# Patient Record
Sex: MALE | NOT HISPANIC OR LATINO | Employment: FULL TIME | URBAN - METROPOLITAN AREA
[De-identification: names, ages, dates, MRNs, and addresses within clinical notes are randomized per-mention and may not be internally consistent; named-entity substitution may affect disease eponyms.]

---

## 2022-01-02 ENCOUNTER — APPOINTMENT (OUTPATIENT)
Dept: CT IMAGING | Facility: CLINIC | Age: 27
End: 2022-01-02
Attending: EMERGENCY MEDICINE
Payer: OTHER MISCELLANEOUS

## 2022-01-02 ENCOUNTER — HOSPITAL ENCOUNTER (EMERGENCY)
Facility: CLINIC | Age: 27
Discharge: HOME | End: 2022-01-02
Attending: EMERGENCY MEDICINE | Admitting: EMERGENCY MEDICINE
Payer: OTHER MISCELLANEOUS

## 2022-01-02 VITALS
BODY MASS INDEX: 24.62 KG/M2 | HEART RATE: 59 BPM | TEMPERATURE: 98.7 F | OXYGEN SATURATION: 98 % | DIASTOLIC BLOOD PRESSURE: 80 MMHG | RESPIRATION RATE: 18 BRPM | SYSTOLIC BLOOD PRESSURE: 127 MMHG | HEIGHT: 70 IN | WEIGHT: 172 LBS

## 2022-01-02 DIAGNOSIS — R10.9 ABDOMINAL WALL PAIN: ICD-10-CM

## 2022-01-02 LAB
CREAT BLD-MCNC: 1.1 MG/DL (ref 0.7–1.3)
ERYTHROCYTE [DISTWIDTH] IN BLOOD BY AUTOMATED COUNT: 11.6 % (ref 10–15)
GFR SERPL CREATININE-BSD FRML MDRD: >60 ML/MIN/1.73M2
HCT VFR BLD AUTO: 40.6 % (ref 40–53)
HGB BLD-MCNC: 14.2 G/DL (ref 13.3–17.7)
MCH RBC QN AUTO: 31.1 PG (ref 26.5–33)
MCHC RBC AUTO-ENTMCNC: 35 G/DL (ref 31.5–36.5)
MCV RBC AUTO: 89 FL (ref 78–100)
PLATELET # BLD AUTO: 270 10E3/UL (ref 150–450)
RBC # BLD AUTO: 4.57 10E6/UL (ref 4.4–5.9)
WBC # BLD AUTO: 6.5 10E3/UL (ref 4–11)

## 2022-01-02 PROCEDURE — 250N000009 HC RX 250: Performed by: EMERGENCY MEDICINE

## 2022-01-02 PROCEDURE — 250N000011 HC RX IP 250 OP 636: Performed by: EMERGENCY MEDICINE

## 2022-01-02 PROCEDURE — 82565 ASSAY OF CREATININE: CPT

## 2022-01-02 PROCEDURE — 99285 EMERGENCY DEPT VISIT HI MDM: CPT | Mod: 25

## 2022-01-02 PROCEDURE — 36415 COLL VENOUS BLD VENIPUNCTURE: CPT | Performed by: EMERGENCY MEDICINE

## 2022-01-02 PROCEDURE — 85027 COMPLETE CBC AUTOMATED: CPT | Performed by: EMERGENCY MEDICINE

## 2022-01-02 PROCEDURE — 74177 CT ABD & PELVIS W/CONTRAST: CPT

## 2022-01-02 RX ORDER — IOPAMIDOL 755 MG/ML
86 INJECTION, SOLUTION INTRAVASCULAR ONCE
Status: COMPLETED | OUTPATIENT
Start: 2022-01-02 | End: 2022-01-02

## 2022-01-02 RX ADMIN — SODIUM CHLORIDE 65 ML: 900 INJECTION INTRAVENOUS at 17:51

## 2022-01-02 RX ADMIN — IOPAMIDOL 86 ML: 755 INJECTION, SOLUTION INTRAVENOUS at 17:51

## 2022-01-02 ASSESSMENT — ENCOUNTER SYMPTOMS
ABDOMINAL PAIN: 1
VOMITING: 0
COUGH: 0
DIARRHEA: 1
COLOR CHANGE: 0
SORE THROAT: 0
MYALGIAS: 1
FEVER: 0

## 2022-01-02 ASSESSMENT — MIFFLIN-ST. JEOR: SCORE: 1761.44

## 2022-01-02 NOTE — ED TRIAGE NOTES
[place] two [place] and since has had left sided abd pain that wraps into back, denies CP or SOB

## 2022-01-02 NOTE — ED PROVIDER NOTES
"  History   Chief Complaint:  Abdominal Pain       The history is provided by the patient.      Juan Sandhu is a 26 year old male who presents with left sided abdominal pain. While [place] yesterday he was hooking up a cart full of luggage he was pinned between two vehicles. He states his work associate did not know he was there. He believes he was stuck for about one second. He notes soreness to his left upper abdomen and lower chest and diarrhea this morning, but no visible bruising. Denies head or shoulder injury, fever, new cough, sore throat and vomiting. Taking deep breaths does not particularly exacerbate his pain.     Review of Systems   Constitutional: Negative for fever.        Negative for head injury.    HENT: Negative for sore throat.    Respiratory: Negative for cough.    Gastrointestinal: Positive for abdominal pain and diarrhea (resolved) . Negative for vomiting.   Musculoskeletal: Positive for myalgias.   Skin: Negative for color change.         Allergies:  No Known Allergies    Medications:  Paroxetine    Past Medical History:     Premature ejaculation  Acute appendicitis    Family History:    Mother: hypertension     Social History:  Presents alone.   His wife is a nurse.  [place]     Physical Exam     Patient Vitals for the past 24 hrs:   BP Temp Temp src Pulse Resp SpO2 Height Weight   01/02/22 1305 (!) 149/64 98.7  F (37.1  C) Tympanic 63 18 98 % 1.778 m (5' 10\") 78 kg (172 lb)       Physical Exam    Physical Exam   Constitutional:  Patient is oriented to person, place, and time. They appear well-developed and well-nourished. Mild distress secondary to right sided abdominal pain.    HENT:   Mouth/Throat:   Oropharynx is clear and moist.   Eyes:    Conjunctivae normal and EOM are normal. Pupils are equal, round, and reactive to light.   Neck:    Normal range of motion.   Cardiovascular: Normal rate, regular rhythm and normal heart sounds.  Exam " reveals no gallop and no friction rub.  No murmur heard.  Pulmonary/Chest:  Effort normal and breath sounds normal. Patient has no wheezes. Patient has no rales.   Abdominal:   Pain to palpation over the left upper quadrant of his abdomen and over the left lower ribs.  Musculoskeletal:  Normal range of motion. Patient exhibits no edema.   Neurological:   Patient is alert and oriented to person, place, and time. Patient has normal strength. No cranial nerve deficit or sensory deficit. GCS 15  Skin:   Skin is warm and dry. No rash noted. No erythema. No abrasions or ecchymosis.  Psychiatric:   Patient has a normal mood and affect. Patient's behavior is normal. Judgment and thought content normal.       Emergency Department Course       Imaging:  CT Chest/Abdomen/Pelvis w Contrast   Final Result   IMPRESSION:   1.  Negative CT exam.        Report per radiology    Laboratory:  Labs Ordered and Resulted from Time of ED Arrival to Time of ED Departure   CBC WITH PLATELETS - Normal       Result Value    WBC Count 6.5      RBC Count 4.57      Hemoglobin 14.2      Hematocrit 40.6      MCV 89      MCH 31.1      MCHC 35.0      RDW 11.6      Platelet Count 270     ISTAT CREATININE POCT - Normal    Creatinine POCT 1.1      GFR, ESTIMATED POCT >60     ISTAT CREATININE POCT        Emergency Department Course:  Reviewed:  I reviewed nursing notes, vitals, past medical history and Care Everywhere    Assessments:  1647 I obtained history and examined the patient as noted above.   1850 I rechecked the patient and explained findings.     Interventions:  Medications   iopamidol (ISOVUE-370) solution 86 mL (86 mLs Intravenous Given 1/2/22 1751)   sodium chloride 0.9 % bag 500mL for CT scan flush use (65 mLs Intravenous Given 1/2/22 1751)       Disposition:  The patient was discharged to home.     Impression & Plan     CMS Diagnoses: None      Medical Decision Making:  Juan Sandhu is a 26 year old male presenting to the emergency  department with left sided pain after getting crushed between a cart and a luggage cart at work briefly. He had no outward signs of injury, but due to the location of his pain both above and below the diaphragm I did do a CT trauma protocol. Fortunately,  there was no acute abnormalities such a pneumothorax, fracture rib, free air, or solid organ injury. At this point I suspect he has abdominal wall bruising. Symptomatic cares were discussed. I do not have any concern for ruptured diaphragm or any significant injury given his exam and CT. At this time, he is safe for discharge. Follow- up with his primary care doctor.          Diagnosis:    ICD-10-CM    1. Abdominal wall pain  R10.9        Discharge Medications:  New Prescriptions    No medications on file       Scribe Disclosure:  I, Jonathan Solis, am serving as a scribe at 4:46 PM on 1/2/2022 to document services personally performed by Nisa Morris MD based on my observations and the provider's statements to me.            Nisa Morris MD  01/02/22 1922